# Patient Record
Sex: FEMALE | Race: BLACK OR AFRICAN AMERICAN | NOT HISPANIC OR LATINO | Employment: UNEMPLOYED | URBAN - METROPOLITAN AREA
[De-identification: names, ages, dates, MRNs, and addresses within clinical notes are randomized per-mention and may not be internally consistent; named-entity substitution may affect disease eponyms.]

---

## 2020-04-28 PROBLEM — L02.31 GLUTEAL ABSCESS: Status: ACTIVE | Noted: 2020-04-28

## 2020-04-28 PROBLEM — I10 HTN (HYPERTENSION): Status: ACTIVE | Noted: 2020-04-28

## 2020-04-28 PROBLEM — E11.9 DIABETES: Status: ACTIVE | Noted: 2020-04-28

## 2020-04-28 PROBLEM — U07.1 REAL TIME REVERSE TRANSCRIPTASE PCR POSITIVE FOR COVID-19 VIRUS: Status: ACTIVE | Noted: 2020-04-28

## 2020-05-04 PROBLEM — E87.6 HYPOKALEMIA: Status: ACTIVE | Noted: 2020-05-04

## 2020-05-05 PROBLEM — U07.1 COVID-19 VIRUS DETECTED: Status: RESOLVED | Noted: 2020-04-28 | Resolved: 2020-05-05

## 2020-05-12 ENCOUNTER — NURSE TRIAGE (OUTPATIENT)
Dept: ADMINISTRATIVE | Facility: CLINIC | Age: 50
End: 2020-05-12

## 2020-05-12 NOTE — TELEPHONE ENCOUNTER
Attempted to contact patient through post procedure symptom monitoring. No contact made. No follow up call required.      Reason for Disposition   No answer.  First attempt to contact caller.  Follow-up call scheduled within 15 minutes.    Additional Information   Negative: Caller has already spoken with the PCP (or office), and has no further questions   Negative: Caller has already spoken with another triager and has no further questions   Negative: Caller has already spoken with another triager or PCP (or office), and has further questions and triager able to answer questions.   Negative: Busy signal.  First attempt to contact caller.  Follow-up call scheduled within 15 minutes.    Protocols used: NO CONTACT OR DUPLICATE CONTACT CALL-A-[place]